# Patient Record
Sex: MALE | Race: WHITE | NOT HISPANIC OR LATINO
[De-identification: names, ages, dates, MRNs, and addresses within clinical notes are randomized per-mention and may not be internally consistent; named-entity substitution may affect disease eponyms.]

---

## 2022-02-08 PROBLEM — Z00.00 ENCOUNTER FOR PREVENTIVE HEALTH EXAMINATION: Status: ACTIVE | Noted: 2022-02-08

## 2022-02-11 ENCOUNTER — NON-APPOINTMENT (OUTPATIENT)
Age: 77
End: 2022-02-11

## 2022-02-11 ENCOUNTER — APPOINTMENT (OUTPATIENT)
Dept: CARDIOLOGY | Facility: CLINIC | Age: 77
End: 2022-02-11
Payer: MEDICARE

## 2022-02-11 VITALS
DIASTOLIC BLOOD PRESSURE: 85 MMHG | SYSTOLIC BLOOD PRESSURE: 170 MMHG | RESPIRATION RATE: 16 BRPM | HEART RATE: 67 BPM | OXYGEN SATURATION: 98 % | BODY MASS INDEX: 25.16 KG/M2 | WEIGHT: 166 LBS | HEIGHT: 68 IN

## 2022-02-11 DIAGNOSIS — F17.210 NICOTINE DEPENDENCE, CIGARETTES, UNCOMPLICATED: ICD-10-CM

## 2022-02-11 DIAGNOSIS — Z78.9 OTHER SPECIFIED HEALTH STATUS: ICD-10-CM

## 2022-02-11 DIAGNOSIS — Z72.3 LACK OF PHYSICAL EXERCISE: ICD-10-CM

## 2022-02-11 DIAGNOSIS — F17.200 NICOTINE DEPENDENCE, UNSPECIFIED, UNCOMPLICATED: ICD-10-CM

## 2022-02-11 PROCEDURE — 93000 ELECTROCARDIOGRAM COMPLETE: CPT

## 2022-02-11 PROCEDURE — 99204 OFFICE O/P NEW MOD 45 MIN: CPT

## 2022-02-11 RX ORDER — ASPIRIN 81 MG
81 TABLET, DELAYED RELEASE (ENTERIC COATED) ORAL DAILY
Refills: 0 | Status: ACTIVE | COMMUNITY

## 2022-02-11 RX ORDER — PERINDOPRIL ERBUMINE 8 MG/1
8 TABLET ORAL DAILY
Qty: 90 | Refills: 0 | Status: ACTIVE | COMMUNITY
Start: 1900-01-01 | End: 1900-01-01

## 2022-02-11 NOTE — HISTORY OF PRESENT ILLNESS
[FreeTextEntry1] : Father may have had CAD? but unsure.\par \par Currently a light cigar smoker since the age of 17. Drinks wine with every meal, does not exercise and wife states he snacks on junk foods. \par \par Mr. Zimmerman is without any coronary symptoms. Denies any exertional discomforts or l/e edema.  \par \par Seems that his BPs have been running high home, 170s/80s but their machine is old. \par \par Has not had any recent available blood work.\par There is been no recent cardiac testing.

## 2022-02-11 NOTE — PHYSICAL EXAM
[de-identified] :                    Well appearing and nourished with no obvious deformities or distress.\par \par Eyes: \par No conjunctival injection and no xanthelasmas.\par HEENT: \par Normocephalic.Normal oral mucosa. No pallor or cyanosis\par Neck: \par No jugular venous distension. with normal A and V wave forms. No palpable adenopathy.\par Cardiovascular: \par Normal rate and rhythm with normal S1, S2 and a grade 1/6 systolic murmur. Distal arterial pulses are normal. No significant peripheral edema.\par Pulmonary: \par Lungs are clear to auscultation and percussion. Normal respiratory pattern without any accessory muscle use\par Abdomen: \par Soft, non-tender ; no palpable organomegaly or masses.\par Extremities:\par No digital clubbing, cyanosis or ischemic changes.\par Skin: \par No skin lesions, rashes, ulcers or xanthomas.\par Psychiatric: \par Alert and oriented to person, place and time. Appropriate mood and affect.

## 2022-02-11 NOTE — REASON FOR VISIT
[FreeTextEntry1] : 76 year old male presenting for initial eval. with our practice but under the care of cardiology in [place]. He is in need of refills of his cardiac medications Perindopril(Aceon) -Amlodipine 5-10 mg QD and Orvatez 10mg/20mg which is Zetia/Atorvastatin combo\par \par He continues to travel frequently to [place].\par \par Hx includes:\par 1. High grade CAD with stenting\par 2. Right carotid disease\par 3. HLD\par 4. HTN\par 5.Reportedly borderline DM on Metformin. \par \par Coronary hx:\par 3/14/2000-[place]/ [place]\par Presented to the ER with chest discomfort. \par \par Distal RCA stents x 3\par Prox LAD stented x 1\par \par 11/12/2012-[place] ( records in [place])\par Presented to the ER with left shoulder discomfort\par \par Distal LAD stented x 1 \par RCA x stent 1\par PLVB x stent 1 \par 2013/[facility]\par \par \par 2013  Right carotid endarterectomy with Dr. Kit Real\par \par Reportedly had a  coronary stent re-look in 2019? during a visit to [place] which was patent?\par No records available

## 2022-02-11 NOTE — ASSESSMENT
[FreeTextEntry1] : ECG-Normal sinus rhythm at 67 bpm.\par Cannot exclude inferior wall infarct age undetermined\par No significant ST or T wave changes.\par \par \par No lab data\par \par Impression\par 76-year-old male with extensive past coronary artery history as detailed above.\par RCA stents x3 in 2000\par RCA and posterior LV branch and LAD stents 2012\par \par Risk factors of tobacco use, hypertension, hyperlipidemia and possible diabetes.\par \par Relative noncompliance with any form of diet exercise or follow-up.\par \par Absence of any acute cardiac symptoms at this point in time.\par \par History of carotid endarterectomy.\par \par Possible inferior wall MI on ECG.\par \par Plan:\par 1.  Had a detailed conversation with patient and his wife about the need for diligent follow-up with cardiology and the impact that risk factor modification and control as well as lifestyle changes could have on his prognosis.\par \par 2.  Full set of blood work ordered and to be obtained shortly\par \par 3.  Carotid duplex ordered to reassess endarterectomy\par \par 4.  Echocardiogram to assess for possible ischemic heart disease especially old inferior wall\par \par 5.  Exercise stress test should be obtained to risk stratify\par \par 6.  Blood pressure elevation needs to be validated.\par We will increase perindopril component of his medication to 8 mg\par Home blood pressure cuff will be assessed for accuracy\par \par Follow-up after the testing.\par Plan\par 1. Will RX Perindopril 8 mg to better control his BPs. Amlodipine will be continued at 10mg. He understands that meds witll be repeated as combo is not available in the states\par \par 2. Continue current Zetia/ Atorvastatin combo until we get new BW\par \par 3.

## 2022-02-14 LAB
ALBUMIN SERPL ELPH-MCNC: 4.6 G/DL
ALP BLD-CCNC: 93 U/L
ALT SERPL-CCNC: 34 U/L
ANION GAP SERPL CALC-SCNC: 16 MMOL/L
AST SERPL-CCNC: 22 U/L
BASOPHILS # BLD AUTO: 0.07 K/UL
BASOPHILS NFR BLD AUTO: 0.8 %
BILIRUB SERPL-MCNC: 0.5 MG/DL
BUN SERPL-MCNC: 19 MG/DL
CALCIUM SERPL-MCNC: 10 MG/DL
CHLORIDE SERPL-SCNC: 102 MMOL/L
CHOLEST SERPL-MCNC: 240 MG/DL
CO2 SERPL-SCNC: 23 MMOL/L
CREAT SERPL-MCNC: 0.91 MG/DL
EOSINOPHIL # BLD AUTO: 0.28 K/UL
EOSINOPHIL NFR BLD AUTO: 3.2 %
ESTIMATED AVERAGE GLUCOSE: 278 MG/DL
GLUCOSE SERPL-MCNC: 319 MG/DL
HBA1C MFR BLD HPLC: 11.3 %
HCT VFR BLD CALC: 50.3 %
HDLC SERPL-MCNC: 56 MG/DL
HGB BLD-MCNC: 16.1 G/DL
IMM GRANULOCYTES NFR BLD AUTO: 0.2 %
LDLC SERPL CALC-MCNC: 132 MG/DL
LYMPHOCYTES # BLD AUTO: 2.82 K/UL
LYMPHOCYTES NFR BLD AUTO: 32.3 %
MAN DIFF?: NORMAL
MCHC RBC-ENTMCNC: 28.9 PG
MCHC RBC-ENTMCNC: 32 GM/DL
MCV RBC AUTO: 90.1 FL
MONOCYTES # BLD AUTO: 0.63 K/UL
MONOCYTES NFR BLD AUTO: 7.2 %
NEUTROPHILS # BLD AUTO: 4.9 K/UL
NEUTROPHILS NFR BLD AUTO: 56.3 %
NONHDLC SERPL-MCNC: 184 MG/DL
PLATELET # BLD AUTO: 194 K/UL
POTASSIUM SERPL-SCNC: 4.7 MMOL/L
PROT SERPL-MCNC: 6.8 G/DL
PSA SERPL-MCNC: 0.74 NG/ML
RBC # BLD: 5.58 M/UL
RBC # FLD: 13.1 %
SODIUM SERPL-SCNC: 141 MMOL/L
TRIGL SERPL-MCNC: 262 MG/DL
TSH SERPL-ACNC: 2.3 UIU/ML
WBC # FLD AUTO: 8.72 K/UL

## 2022-02-18 ENCOUNTER — APPOINTMENT (OUTPATIENT)
Dept: CARDIOLOGY | Facility: CLINIC | Age: 77
End: 2022-02-18
Payer: MEDICARE

## 2022-02-18 PROCEDURE — 93306 TTE W/DOPPLER COMPLETE: CPT

## 2022-02-18 PROCEDURE — 93880 EXTRACRANIAL BILAT STUDY: CPT

## 2022-02-21 ENCOUNTER — APPOINTMENT (OUTPATIENT)
Dept: CARDIOLOGY | Facility: CLINIC | Age: 77
End: 2022-02-21
Payer: MEDICARE

## 2022-02-21 PROCEDURE — 93015 CV STRESS TEST SUPVJ I&R: CPT

## 2022-02-22 RX ORDER — ATORVASTATIN CALCIUM 20 MG/1
20 TABLET, FILM COATED ORAL DAILY
Qty: 60 | Refills: 0 | Status: DISCONTINUED | COMMUNITY
End: 2022-02-22

## 2022-03-10 ENCOUNTER — APPOINTMENT (OUTPATIENT)
Dept: CARDIOLOGY | Facility: CLINIC | Age: 77
End: 2022-03-10
Payer: MEDICARE

## 2022-03-10 VITALS
BODY MASS INDEX: 24.86 KG/M2 | HEART RATE: 58 BPM | DIASTOLIC BLOOD PRESSURE: 84 MMHG | HEIGHT: 68 IN | RESPIRATION RATE: 16 BRPM | SYSTOLIC BLOOD PRESSURE: 142 MMHG | WEIGHT: 164 LBS

## 2022-03-10 PROCEDURE — 93000 ELECTROCARDIOGRAM COMPLETE: CPT

## 2022-03-10 PROCEDURE — 99214 OFFICE O/P EST MOD 30 MIN: CPT

## 2022-03-10 RX ORDER — GLIPIZIDE 5 MG/1
5 TABLET, FILM COATED, EXTENDED RELEASE ORAL DAILY
Refills: 0 | Status: ACTIVE | COMMUNITY

## 2022-03-10 RX ORDER — SITAGLIPTIN AND METFORMIN HYDROCHLORIDE 50; 1000 MG/1; MG/1
50-1000 TABLET, FILM COATED ORAL TWICE DAILY
Refills: 0 | Status: ACTIVE | COMMUNITY

## 2022-03-10 NOTE — ASSESSMENT
[FreeTextEntry1] : ECG-Normal sinus rhythm at 58  bpm.\par Cannot exclude inferior wall infarct age undetermined\par No significant ST or T wave changes.\par \par Lab data\par ----2/14/2022:\par Chol--- 240\par HDL----56\par LDL--- 132\par Trig--- 262\par A1c--- 11.3\par Hgb--- 16.1\par \par Carotid duplex 2/18/2022:\par Left: Moderate plaque CCA and ICA: Peak 92 cm/s: 1 to 49%\par Right: Mild CCA: Peak 87 cm/s: Largely patent post CEA.\par \par Echocardiogram 2/18/2022:\par Discrete area of hypokinesis involving the basal inferoseptal and inferior wall.\par Overall normal left ventricular systolic function\par Focal aortic valve sclerosis with mild restriction.\par \par Stress test 2/21/2022:\par Time: 6 minutes (7 METS)\par Heart rate: 136 (94%)\par Blood pressure: 214/88 mmHg hypertensive\par ECG: Equivocal 1 mm ST segment depressions inferiorly\par \par Impression\par 1.  76-year-old male with extensive past coronary artery history as detailed above.\par RCA stents x 3 in 2000\par RCA and posterior LV branch and LAD stents 2012\par Reduced overall exercise tolerance with no angina and no definitive evidence of ischemia on stress testing.\par \par 2.  Hypertension control improved, over, still elevated.\par \par 3.  Hyperlipidemia with improved diet and change to rosuvastatin made.  Repeat labs with pending in about 2 months\par \par 4.  Status post right carotid endarterectomy, which appears relatively patent.\par       Moderate atherosclerosis of left internal carotid artery without any high-grade stenosis.\par \par 5. Relative noncompliance with any form of diet exercise or follow-up.\par \par 6.  Primary care providing augmented management of diabetes with improvement in diet.\par \par 7.  Echocardiogram demonstrating definite inferior wall infarct though preservation of LV function is noted.\par \par Plan:\par 1.  Had a detailed conversation with patient and his wife about the need for diligent follow-up with cardiology and the impact that risk factor modification and control as well as lifestyle changes could have on his prognosis.\par \par 2.  Continuation of current diabetes meds and rosuvastatin with repeat blood work pending\par \par 3.  Add hydrochlorothiazide 25 mg a day repeat blood work in a few weeks\par \par 4.  Repeat carotid duplex in 1 year\par \par 5.  Consider nuclear stress test given the equivocal ST segment abnormalities either in 1 year or sooner if there are new symptoms\par \par 6.  Clinical follow-up here in 3 months\par

## 2022-03-10 NOTE — HISTORY OF PRESENT ILLNESS
[FreeTextEntry1] : Father may have had CAD? but unsure.\par \par Currently a light cigar smoker since the age of 17. Drinks wine with every meal, does not exercise and wife states he snacks on junk foods. \par \par Mr. Zimmerman is without any coronary symptoms. Denies any exertional discomforts or l/e edema.  \par \par Seems that his BPs have been running high home, 170s/80s but their machine is old.  Now in the 140s with medication change\par \par

## 2022-03-10 NOTE — REASON FOR VISIT
[FreeTextEntry1] : 76 year old male presenting for cardiac re- eval. with fragmented care as he typically receives his cardiology follow-up in [place].\par Presenting here for management as he is in need of refills of his cardiac medications Perindopril(Aceon) -Amlodipine 5-10 mg QD and Orvatez 10mg/20mg which is Zetia/Atorvastatin combo\par \par Prior visit, we had increased Aceon (perindopril) to 8 mg daily and given amlodipine as a separate 10 mg dose.\par Based on abnormal blood work, we had switched him over to rosuvastatin 40 mg daily and advised urgent evaluation with regard to managing uncontrolled diabetes mellitus.\par Glipizide 5 ER and Janumet 50/1000 twice daily are now being taken with improvement in his serum sugars.\par \par Testing including: \par Carotid Doppler\par An echocardiogram\par Stress test\par Will be reviewed today\par \par He continues to travel frequently to [place].\par \par Hx includes:\par 1. High grade CAD with stenting\par 2. Right carotid disease status post CEA\par 3. HLD\par 4. HTN\par 5.Reportedly borderline DM on Metformin. \par \par Coronary hx:\par 3/14/2000-[place]/ [place]\par Presented to the ER with chest discomfort. \par \par Distal RCA stents x 3\par Prox LAD stented x 1\par \par 11/12/2012-[place] ( records in [place]par Presented to the ER with left shoulder discomfort\par \par Distal LAD stented x 1 \par RCA x stent 1\par PLVB x stent 1 \par \par 2013/[facility]\par 2013  Right carotid endarterectomy with Dr. Kit Real\par \par Reportedly had a  coronary stent re-look in 2019? during a visit to [place] which was patent?\par No records available

## 2022-03-10 NOTE — PHYSICAL EXAM
[de-identified] :                    Well appearing and nourished with no obvious deformities or distress.\par \par Eyes: \par No conjunctival injection and no xanthelasmas.\par HEENT: \par Normocephalic.Normal oral mucosa. No pallor or cyanosis\par Neck: \par No jugular venous distension. with normal A and V wave forms. No palpable adenopathy.\par Cardiovascular: \par Normal rate and rhythm with normal S1, S2 and a grade 1/6 systolic murmur. Distal arterial pulses are normal. No significant peripheral edema.\par Pulmonary: \par Lungs are clear to auscultation and percussion. Normal respiratory pattern without any accessory muscle use\par Abdomen: \par Soft, non-tender ; no palpable organomegaly or masses.\par Extremities:\par No digital clubbing, cyanosis or ischemic changes.\par Skin: \par No skin lesions, rashes, ulcers or xanthomas.\par Psychiatric: \par Alert and oriented to person, place and time. Appropriate mood and affect.

## 2022-05-09 ENCOUNTER — NON-APPOINTMENT (OUTPATIENT)
Age: 77
End: 2022-05-09

## 2022-05-24 ENCOUNTER — RX RENEWAL (OUTPATIENT)
Age: 77
End: 2022-05-24

## 2022-05-26 ENCOUNTER — APPOINTMENT (OUTPATIENT)
Dept: CARDIOLOGY | Facility: CLINIC | Age: 77
End: 2022-05-26

## 2022-06-13 ENCOUNTER — APPOINTMENT (OUTPATIENT)
Age: 77
End: 2022-06-13

## 2022-09-01 ENCOUNTER — NON-APPOINTMENT (OUTPATIENT)
Age: 77
End: 2022-09-01

## 2022-09-16 ENCOUNTER — APPOINTMENT (OUTPATIENT)
Dept: CARDIOLOGY | Facility: CLINIC | Age: 77
End: 2022-09-16

## 2022-09-16 PROCEDURE — 93306 TTE W/DOPPLER COMPLETE: CPT

## 2022-09-22 ENCOUNTER — APPOINTMENT (OUTPATIENT)
Dept: CARDIOLOGY | Facility: CLINIC | Age: 77
End: 2022-09-22

## 2022-09-22 VITALS
SYSTOLIC BLOOD PRESSURE: 126 MMHG | DIASTOLIC BLOOD PRESSURE: 72 MMHG | BODY MASS INDEX: 23.95 KG/M2 | RESPIRATION RATE: 14 BRPM | HEIGHT: 68 IN | HEART RATE: 63 BPM | WEIGHT: 158 LBS

## 2022-09-22 PROCEDURE — 99214 OFFICE O/P EST MOD 30 MIN: CPT

## 2022-09-22 PROCEDURE — 93000 ELECTROCARDIOGRAM COMPLETE: CPT

## 2022-09-22 RX ORDER — HYDROCHLOROTHIAZIDE 25 MG/1
25 TABLET ORAL DAILY
Qty: 90 | Refills: 0 | Status: DISCONTINUED | COMMUNITY
Start: 2022-03-10 | End: 2022-09-22

## 2022-09-22 RX ORDER — ATORVASTATIN CALCIUM 40 MG/1
40 TABLET, FILM COATED ORAL
Refills: 0 | Status: ACTIVE | COMMUNITY

## 2022-09-22 RX ORDER — ROSUVASTATIN CALCIUM 40 MG/1
40 TABLET, FILM COATED ORAL DAILY
Qty: 90 | Refills: 2 | Status: DISCONTINUED | COMMUNITY
Start: 2022-02-22 | End: 2022-09-22

## 2022-09-22 RX ORDER — EZETIMIBE 10 MG/1
10 TABLET ORAL DAILY
Qty: 90 | Refills: 2 | Status: DISCONTINUED | COMMUNITY
End: 2022-09-22

## 2022-09-22 NOTE — PHYSICAL EXAM
[de-identified] :                    Well appearing and nourished with no obvious deformities or distress.\par \par Eyes: \par No conjunctival injection and no xanthelasmas.\par HEENT: \par Normocephalic.Normal oral mucosa. No pallor or cyanosis\par Neck: \par No jugular venous distension. with normal A and V wave forms. No palpable adenopathy.\par Cardiovascular: \par Normal rate and rhythm with normal S1, S2 and a grade 1/6 systolic murmur. Distal arterial pulses are normal. No significant peripheral edema.\par Pulmonary: \par Lungs are clear to auscultation and percussion. Normal respiratory pattern without any accessory muscle use\par Abdomen: \par Soft, non-tender ; no palpable organomegaly or masses.\par Extremities:\par No digital clubbing, cyanosis or ischemic changes.\par Skin: \par No skin lesions, rashes, ulcers or xanthomas.\par Psychiatric: \par Alert and oriented to person, place and time. Appropriate mood and affect.

## 2022-09-22 NOTE — ASSESSMENT
[FreeTextEntry1] : ECG-sinus rhythm at 63.  Possible old inferior wall MI.  No significant ST-T wave changes.\par \par Lab data\par ----2/14/2022:\par Chol--- 240\par HDL----56\par LDL--- 132\par Trig--- 262\par A1c--- 11.3\par Hgb--- 16.1\par \par Carotid duplex 2/18/2022:\par Left: Moderate plaque CCA and ICA: Peak 92 cm/s: 1 to 49%\par Right: Mild CCA: Peak 87 cm/s: Largely patent post CEA.\par \par Echocardiogram 9/16/2022:\par Inferobasilar wall hypokinesis with overall normal left ventricular systolic function LVEF 55 to 60%\par Mild dilatation of the ascending aorta 3.7 cm\par Small circumferential pericardial effusion.\par \par Echocardiogram 2/18/2022:\par Discrete area of hypokinesis involving the basal inferoseptal and inferior wall.\par Overall normal left ventricular systolic function\par Focal aortic valve sclerosis with mild restriction.\par \par Stress test 2/21/2022:\par Time: 6 minutes (7 METS)\par Heart rate: 136 (94%)\par Blood pressure: 214/88 mmHg hypertensive\par ECG: Equivocal 1 mm ST segment depressions inferiorly\par \par Impression\par 1.  76-year-old male with extensive past coronary artery history as detailed above.\par RCA stents x 3 in 2000\par RCA and posterior LV branch and LAD stents 2012\par Reduced overall exercise tolerance with no angina and no definitive evidence of ischemia on stress testing.\par \par 2.  Hypertension controlled off of hydrochlorothiazide.\par \par 3.  Hyperlipidemia intolerant of rosuvastatin back on atorvastatin.  No labs available.\par \par 4.  Status post right carotid endarterectomy, which appears relatively patent.\par       Moderate atherosclerosis of left internal carotid artery without any high-grade stenosis.\par \par 5.  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  Primary care providing augmented management of diabetes with improvement in diet.\par \par 7.  Echocardiogram demonstrating definite inferior wall infarct though preservation of LV function is noted.\par \par Plan:\par 1.  Repeat echocardiogram in about 3 months to relook at the pericardial effusion.\par      Reassured at this point it is small and without hemodynamic effect and no indication for any drainage.\par \par 2.  Continuation of current diabetes meds and atorvastatin with repeat blood work pending\par \par 3.  Repeat carotid duplex in 1 year\par \par   Clinical follow-up here in 3 months\par

## 2022-09-22 NOTE — REASON FOR VISIT
[FreeTextEntry1] : 76 year old male presenting for cardiac re- eval. 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 noted at the time of diagnosis.\par Started on carboplatin etoposide with a reasonable clinical response.\par CT scan chest and abdomen reportedly showed a pericardial effusion prompting echocardiography and evaluation here today.\par \par Patient states that he has far less back and hip pain than he did previously and denies any significant shortness of breath.\par \par He has continued on his antihypertensive regimen though hydrochlorothiazide was dropped presumptively because of blood pressure downtrending.\par \par Prior visit, we had increased Aceon (perindopril) to 8 mg daily and given amlodipine as a separate 10 mg dose.\par Based on abnormal blood work, we had switched him over to rosuvastatin 40 mg daily but this led to myalgias in the legs and he was switched back to atorvastatin 40 mg daily.\par Glipizide 5 ER and Janumet 50/1000 twice daily are now being taken with improvement in his serum sugars.\par \par Hx includes:\par 1. High grade CAD with stenting\par 2. Right carotid disease status post CEA\par 3. HLD\par 4. HTN\par 5.Reportedly borderline DM on Metformin. \par \par Coronary hx:\par 3/14/2000-[place]/ [place]\par Presented to the ER with chest discomfort. \par \par Distal RCA stents x 3\par Prox LAD stented x 1\par \par 11/12/2012-[place] ( records in [place])\par Presented to the ER with left shoulder discomfort\par \par Distal LAD stented x 1 \par RCA x stent 1\par PLVB x stent 1 \par \par 2013/[facility]\par 2013  Right carotid endarterectomy with Dr. Kit Real\par \par Reportedly had a  coronary stent re-look in 2019? during a visit to [place] which was patent?\par No records available

## 2022-11-29 ENCOUNTER — LABORATORY RESULT (OUTPATIENT)
Age: 77
End: 2022-11-29

## 2022-12-01 ENCOUNTER — APPOINTMENT (OUTPATIENT)
Dept: CARDIOLOGY | Facility: CLINIC | Age: 77
End: 2022-12-01

## 2022-12-01 PROCEDURE — 93306 TTE W/DOPPLER COMPLETE: CPT

## 2022-12-06 ENCOUNTER — APPOINTMENT (OUTPATIENT)
Dept: CARDIOLOGY | Facility: CLINIC | Age: 77
End: 2022-12-06

## 2022-12-06 VITALS
BODY MASS INDEX: 24.4 KG/M2 | SYSTOLIC BLOOD PRESSURE: 122 MMHG | HEART RATE: 65 BPM | HEIGHT: 68 IN | OXYGEN SATURATION: 97 % | WEIGHT: 161 LBS | DIASTOLIC BLOOD PRESSURE: 60 MMHG | RESPIRATION RATE: 16 BRPM

## 2022-12-06 DIAGNOSIS — E11.9 TYPE 2 DIABETES MELLITUS W/OUT COMPLICATIONS: ICD-10-CM

## 2022-12-06 PROCEDURE — 93000 ELECTROCARDIOGRAM COMPLETE: CPT

## 2022-12-06 PROCEDURE — 99214 OFFICE O/P EST MOD 30 MIN: CPT

## 2022-12-06 NOTE — ASSESSMENT
[FreeTextEntry1] : ECG-sinus rhythm at 65.  Possible old inferior wall MI.  No significant ST-T wave changes.\par \par Lab data\par ----2/14/2022:--11/29/22\par Chol--- 240-------175\par HDL----56---------47\par LDL--- 132--------75\par Trig--- 262--------264\par A1c--- 11.3\par Hgb--- 16.1\par \par Carotid duplex 2/18/2022:\par Left: Moderate plaque CCA and ICA: Peak 92 cm/s: 1 to 49%\par Right: Mild CCA: Peak 87 cm/s: Largely patent post CEA.\par \par Echocardiogram 12/1/2022:\par Basal inferolateral hypokinesis with  low normal function LVEF 50 to 55%\par Mildly dry left atrial\par Small circumferential pericardial effusion unchanged from prior study.\par \par Echocardiogram 9/16/2022:\par Inferobasilar wall hypokinesis with overall normal left ventricular systolic function LVEF 55 to 60%\par Mild dilatation of the ascending aorta 3.7 cm\par Small circumferential pericardial effusion.\par \par Echocardiogram 2/18/2022:\par Discrete area of hypokinesis involving the basal inferoseptal and inferior wall.\par Overall normal left ventricular systolic function\par Focal aortic valve sclerosis with mild restriction.\par \par Stress test 2/21/2022:\par Time: 6 minutes (7 METS)\par Heart rate: 136 (94%)\par Blood pressure: 214/88 mmHg hypertensive\par ECG: Equivocal 1 mm ST segment depressions inferiorly\par \par Impression\par 1.  76-year-old male with extensive past coronary artery history as detailed above.\par RCA stents x 3 in 2000\par RCA and posterior LV branch and LAD stents 2012\par Reduced overall exercise tolerance with no angina and no definitive evidence of ischemia on stress testing.\par \par 2.  Hypertension controlled off of hydrochlorothiazide.\par \par 3.  Hyperlipidemia intolerant of rosuvastatin back on atorvastatin and markedly improved (see above)\par \par 4.  Status post right carotid endarterectomy, which appears relatively patent.\par       Moderate atherosclerosis of left internal carotid artery without any high-grade stenosis.\par \par 5.  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  Primary care providing augmented management of diabetes with improvement in diet.\par \par 7.  Echocardiogram demonstrating definite inferior wall infarct though preservation of LV function is noted.\par \par Plan:\par 1.  Repeat echocardiogram in about 4 months to relook at the pericardial effusion.\par      Reassured at this point it is small and without hemodynamic effect and no indication for any drainage.\par \par 2.  Continuation of current diabetes meds and atorvastatin with repeat blood work in 3 to 4 months\par \par 3.  Repeat carotid duplex in 1 year\par \par   Clinical follow-up here 4 months\par

## 2022-12-06 NOTE — PHYSICAL EXAM
[de-identified] :                    Well appearing and nourished with no obvious deformities or distress.\par \par Eyes: \par No conjunctival injection and no xanthelasmas.\par HEENT: \par Normocephalic.Normal oral mucosa. No pallor or cyanosis\par Neck: \par No jugular venous distension. with normal A and V wave forms. No palpable adenopathy.\par Cardiovascular: \par Normal rate occasional extrasystoles with normal S1, S2 and a grade 1/6 systolic murmur. Distal arterial pulses are normal. No significant peripheral edema.\par Pulmonary: \par Lungs are clear to auscultation and percussion, slightly decreased breath sounds at the right base. Normal respiratory pattern without any accessory muscle use\par Abdomen: \par Soft, non-tender ; no palpable organomegaly or masses.\par Extremities:\par No digital clubbing, cyanosis or ischemic changes.\par Skin: \par No skin lesions, rashes, ulcers or xanthomas.\par Psychiatric: \par Alert and oriented to person, place and time. Appropriate mood and affect.

## 2022-12-06 NOTE — REASON FOR VISIT
Home
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 noted at the time of diagnosis.\par Started on carboplatin etoposide with a reasonable clinical response.\par CT scan chest and abdomen reportedly showed a pericardial effusion\par Echocardiography 9/16/2022 showed a small circumferential pericardial effusion which was followed up with with a repeat echocardiogram 12/1/2022.\par \par Patient states that he has far less back and hip pain than he did previously and denies any significant shortness of breath.\par \par He has continued on his antihypertensive regimen though hydrochlorothiazide was dropped presumptively because of blood pressure downtrending.\par \par Previously, increased Aceon (perindopril) to 8 mg daily and given amlodipine as a separate 10 mg dose.\par Based on abnormal blood work, we had switched him over to rosuvastatin 40 mg daily but this led to myalgias in the legs and he was switched back to atorvastatin 40 mg daily.\par Glipizide 5 ER and Janumet 50/1000 twice daily are now being taken with improvement in his serum sugars.\par \par Hx includes:\par 1. High grade CAD with stenting of proximal LAD and distal RCA\par 2. Right carotid disease status post CEA\par 3. HLD\par 4. HTN\par 5.Reportedly borderline DM on Metformin. \par \par Coronary hx:\par 3/14/2000-[place]/ [place]\par Presented to the ER with chest discomfort. \par \par Distal RCA stents x 3\par Prox LAD stented x 1\par \par 11/12/2012-[place] ( records in [place])\par Presented to the ER with left shoulder discomfort\par \par Distal LAD stented x 1 \par RCA x stent 1\par PLVB x stent 1 \par \par 2013/[facility]\par 2013  Right carotid endarterectomy with Dr. Kit Real\par \par Reportedly had a  coronary stent re-look in 2019? during a visit to [place] which was patent?\par No records available

## 2022-12-06 NOTE — HISTORY OF PRESENT ILLNESS
[FreeTextEntry1] : Father may have had CAD? but unsure.\par \par Currently a light cigar smoker since the age of 17. Drinks wine with every meal, does not exercise and wife states he snacks on junk foods. \par \par Mr. Zimmerman is without any coronary symptoms. Denies any exertional discomforts or l/e edema.  \par \par \par \par

## 2023-04-26 ENCOUNTER — APPOINTMENT (OUTPATIENT)
Dept: CARDIOLOGY | Facility: CLINIC | Age: 78
End: 2023-04-26

## 2023-05-02 ENCOUNTER — APPOINTMENT (OUTPATIENT)
Dept: CARDIOLOGY | Facility: CLINIC | Age: 78
End: 2023-05-02
Payer: MEDICARE

## 2023-05-02 PROCEDURE — 93306 TTE W/DOPPLER COMPLETE: CPT

## 2023-05-17 ENCOUNTER — APPOINTMENT (OUTPATIENT)
Dept: CARDIOLOGY | Facility: CLINIC | Age: 78
End: 2023-05-17
Payer: MEDICARE

## 2023-05-17 ENCOUNTER — NON-APPOINTMENT (OUTPATIENT)
Age: 78
End: 2023-05-17

## 2023-05-17 VITALS
SYSTOLIC BLOOD PRESSURE: 140 MMHG | RESPIRATION RATE: 15 BRPM | WEIGHT: 165.8 LBS | BODY MASS INDEX: 25.13 KG/M2 | DIASTOLIC BLOOD PRESSURE: 68 MMHG | OXYGEN SATURATION: 95 % | HEART RATE: 86 BPM | HEIGHT: 68 IN

## 2023-05-17 DIAGNOSIS — E78.5 HYPERLIPIDEMIA, UNSPECIFIED: ICD-10-CM

## 2023-05-17 DIAGNOSIS — I25.10 ATHEROSCLEROTIC HEART DISEASE OF NATIVE CORONARY ARTERY W/OUT ANGINA PECTORIS: ICD-10-CM

## 2023-05-17 DIAGNOSIS — I31.39 OTHER PERICARDIAL EFFUSION (NONINFLAMMATORY): ICD-10-CM

## 2023-05-17 DIAGNOSIS — Z95.5 PRESENCE OF CORONARY ANGIOPLASTY IMPLANT AND GRAFT: ICD-10-CM

## 2023-05-17 DIAGNOSIS — I77.9 DISORDER OF ARTERIES AND ARTERIOLES, UNSPECIFIED: ICD-10-CM

## 2023-05-17 DIAGNOSIS — I10 ESSENTIAL (PRIMARY) HYPERTENSION: ICD-10-CM

## 2023-05-17 PROCEDURE — 99214 OFFICE O/P EST MOD 30 MIN: CPT

## 2023-05-17 PROCEDURE — 93000 ELECTROCARDIOGRAM COMPLETE: CPT

## 2023-05-17 RX ORDER — AMLODIPINE BESYLATE 10 MG/1
10 TABLET ORAL
Qty: 90 | Refills: 1 | Status: DISCONTINUED | COMMUNITY
End: 2023-05-17

## 2023-05-17 RX ORDER — METFORMIN HYDROCHLORIDE 1000 MG/1
1000 TABLET, COATED ORAL DAILY
Refills: 0 | Status: ACTIVE | COMMUNITY

## 2023-05-19 PROBLEM — E78.5 HLD (HYPERLIPIDEMIA): Status: ACTIVE | Noted: 2022-02-11

## 2023-05-19 PROBLEM — I10 HTN (HYPERTENSION): Status: ACTIVE | Noted: 2022-02-11

## 2023-05-19 PROBLEM — I25.10 CORONARY ARTERY DISEASE: Status: ACTIVE | Noted: 2022-02-11

## 2023-05-19 PROBLEM — I31.39 PERICARDIAL EFFUSION: Status: ACTIVE | Noted: 2022-12-06

## 2023-05-19 PROBLEM — Z95.5 HISTORY OF HEART ARTERY STENT: Status: ACTIVE | Noted: 2022-02-11

## 2023-05-19 PROBLEM — I77.9 CAROTID ARTERY DISEASE: Status: ACTIVE | Noted: 2022-02-11

## 2023-05-29 NOTE — HISTORY OF PRESENT ILLNESS
[FreeTextEntry1] : Patient reports feeling well in general. He continues to remain active around the house, doing yard work. Pt denies any chest pain, dizziness, syncope, near-syncope, SOB, orthopnea or PND.\par \par Does not check his blood pressure regularly at home but reports "normal" readings at other doctors appointments. \par \par \par \par

## 2023-05-29 NOTE — PHYSICAL EXAM
[de-identified] :                    Well appearing and nourished with no obvious deformities or distress.\par \par Eyes: \par No conjunctival injection and no xanthelasmas.\par HEENT: \par Normocephalic.Normal oral mucosa. No pallor or cyanosis\par Neck: \par No jugular venous distension. with normal A and V wave forms. No palpable adenopathy.\par Cardiovascular: \par Normal rate occasional extrasystoles with normal S1, S2 and a grade 1/6 systolic murmur. Distal arterial pulses are normal. No significant peripheral edema.\par Pulmonary: \par Lungs are clear to auscultation and percussion, slightly decreased breath sounds at the right base. Normal respiratory pattern without any accessory muscle use\par Abdomen: \par Soft, non-tender ; no palpable organomegaly or masses.\par Extremities:\par No digital clubbing, cyanosis or ischemic changes.\par Skin: \par No skin lesions, rashes, ulcers or xanthomas.\par Psychiatric: \par Alert and oriented to person, place and time. Appropriate mood and affect.

## 2023-05-29 NOTE — REVIEW OF SYSTEMS
[Weight Gain (___ Lbs)] : [unfilled] ~Ulb weight gain [SOB] : no shortness of breath [Dyspnea on exertion] : not dyspnea during exertion [Chest Discomfort] : no chest discomfort [Cough] : no cough [FreeTextEntry2] : Other than as documented here and in the HPI, the thirteen point ROS is negative

## 2023-05-29 NOTE — ASSESSMENT
[FreeTextEntry1] : ECG-sinus rhythm at 86 bom. Occ PVC. Possible old inferior wall infarct.  No significant ST-T wave changes.\par \par Lab data\par ----2/14/2022:--11/29/22\par Chol--- 240-------175\par HDL----56---------47\par LDL--- 132--------75\par Trig--- 262--------264\par A1c--- 11.3\par Hgb--- 16.1\par \par Carotid duplex 2/18/2022:\par Left: Moderate plaque CCA and ICA: Peak 92 cm/s: 1 to 49%\par Right: Mild CCA: Peak 87 cm/s: Largely patent post CEA.\par \par Echocardiogram 5/2/2023:\par Basal inferior inferolateral hypokinesis with ejection fraction of 55%\par Mild left atrial enlargement\par Low normal RV function\par Small pericardial effusion with right atrial inversion.\par Focal aortic valve sclerosis.\par \par Echocardiogram 12/1/2022:\par Basal inferolateral hypokinesis with  low normal function LVEF 50 to 55%\par Mildly dry left atrial\par Small circumferential pericardial effusion unchanged from prior study.\par \par Echocardiogram 9/16/2022:\par Inferobasilar wall hypokinesis with overall normal left ventricular systolic function LVEF 55 to 60%\par Mild dilatation of the ascending aorta 3.7 cm\par Small circumferential pericardial effusion.\par \par Echocardiogram 2/18/2022:\par Discrete area of hypokinesis involving the basal inferoseptal and inferior wall.\par Overall normal left ventricular systolic function\par Focal aortic valve sclerosis with mild restriction.\par \par Stress test 2/21/2022:\par Time: 6 minutes (7 METS)\par Heart rate: 136 (94%)\par Blood pressure: 214/88 mmHg hypertensive\par ECG: Equivocal 1 mm ST segment depressions inferiorly\par \par Impression\par 1.  76-year-old male with extensive past coronary artery history as detailed above.\par RCA stents x 3 in 2000\par RCA and posterior LV branch and LAD stents 2012\par Reduced overall exercise tolerance with no angina and no definitive evidence of ischemia on stress testing.\par \par 2.  Hypertension possibly inadequate control with amlodipine 10 mg. \par \par 3.  Hyperlipidemia intolerant of rosuvastatin back on atorvastatin and markedly improved in the past. no recent lipid panel for review. \par \par 4.  Status post right carotid endarterectomy, which appears relatively patent.  On 2022 study\par       Moderate atherosclerosis of left internal carotid artery without any high-grade stenosis.\par \par 5.  Hx of pericardial effusion. Echocardiogram done earlier this morning shows small pericardial effusion without tamponade, LVEF 55% ischemic CM, normal RV. No signs of HF on exam. No SOB. \par      \par \par Plan:\par 1.  Discontinue Amlodipine and replace with Nifedipine 90 mg for better BP control. Monitor home blood pressures and notify us of persistent elevations. Bring BP log to his next OV. \par \par 2.  Continuation of current diabetes meds and atorvastatin. Repeat lipid panel and CMP. Follow a low-fat, low-carbohydrate diet to lower cholesterol. \par \par 3.  Repeat carotid duplex. \par \par 4. Restart Aspirin 81 mg daily. Patient stopped on his own, does not know for how long. States it is possible he stopped it prior to a biopsy and never restarted. \par \par 5.  Need to get some updated information regarding management of his lung cancer.\par   Clinical follow-up here 4 months\par

## 2023-05-29 NOTE — REASON FOR VISIT
[FreeTextEntry1] : 77  year old male presenting for cardiac re- evaluation and to discuss the results of his recent echocardiogram. 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 High grade CAD with stenting of proximal LAD and distal RCA\par 2. Right carotid disease status post CEA\par 3. HLD\par 4. HTN\par 5.Reportedly borderline DM on Metformin. \par \par Coronary hx:\par 3/14/2000-[place]/ [place]\par Presented to the ER with chest discomfort. \par \par Distal RCA stents x 3\par Prox LAD stented x 1\par \par 11/12/2012-[place] ( records in [place])\par Presented to the ER with left shoulder discomfort\par \par Distal LAD stented x 1 \par RCA x stent 1\par PLVB x stent 1 \par \par 2013/[facility]\par 2013  Right carotid endarterectomy with Dr. Kit Real\par \par Reportedly had a  coronary stent re-look in 2019 during a visit to [place] which was patent. \par

## 2023-07-25 ENCOUNTER — NON-APPOINTMENT (OUTPATIENT)
Age: 78
End: 2023-07-25

## 2023-09-19 ENCOUNTER — NON-APPOINTMENT (OUTPATIENT)
Age: 78
End: 2023-09-19

## 2023-09-19 RX ORDER — NIFEDIPINE 90 MG/1
90 TABLET, EXTENDED RELEASE ORAL DAILY
Qty: 90 | Refills: 1 | Status: DISCONTINUED | COMMUNITY
Start: 2023-05-17 | End: 2023-09-19

## 2023-09-19 RX ORDER — AMLODIPINE BESYLATE 10 MG/1
10 TABLET ORAL
Refills: 0 | Status: ACTIVE | COMMUNITY
Start: 1900-01-01 | End: 1900-01-01

## 2023-09-19 RX ORDER — METOPROLOL SUCCINATE 200 MG/1
200 TABLET, EXTENDED RELEASE ORAL DAILY
Qty: 90 | Refills: 3 | Status: ACTIVE | COMMUNITY

## 2023-09-26 ENCOUNTER — APPOINTMENT (OUTPATIENT)
Dept: CARDIOLOGY | Facility: CLINIC | Age: 78
End: 2023-09-26

## 2023-09-26 ENCOUNTER — OFFICE (OUTPATIENT)
Dept: URBAN - METROPOLITAN AREA CLINIC 63 | Facility: CLINIC | Age: 78
Setting detail: OPHTHALMOLOGY
End: 2023-09-26
Payer: MEDICARE

## 2023-09-26 DIAGNOSIS — H25.13: ICD-10-CM

## 2023-09-26 DIAGNOSIS — E11.9: ICD-10-CM

## 2023-09-26 DIAGNOSIS — H40.033: ICD-10-CM

## 2023-09-26 DIAGNOSIS — H04.123: ICD-10-CM

## 2023-09-26 PROCEDURE — 92250 FUNDUS PHOTOGRAPHY W/I&R: CPT | Performed by: OPHTHALMOLOGY

## 2023-09-26 PROCEDURE — 92002 INTRM OPH EXAM NEW PATIENT: CPT | Performed by: OPHTHALMOLOGY

## 2023-09-26 PROCEDURE — 92020 GONIOSCOPY: CPT | Performed by: OPHTHALMOLOGY

## 2023-09-26 ASSESSMENT — REFRACTION_MANIFEST
OU_VA: 20/20
OS_ADD: +2.25
OD_SPHERE: +2.00
OS_CYLINDER: -1.00
OD_ADD: +2.25
OS_VA1: 20/20
OD_CYLINDER: -0.50
OS_AXIS: 092
OD_VA1: 20/25
OS_SPHERE: +2.25
OD_AXIS: 074

## 2023-09-26 ASSESSMENT — KERATOMETRY
OS_AXISANGLE_DEGREES: 016
OD_AXISANGLE_DEGREES: 139
OD_K1POWER_DIOPTERS: 42.75
OS_K2POWER_DIOPTERS: 44.00
OS_K1POWER_DIOPTERS: 43.25
OD_K2POWER_DIOPTERS: 43.75

## 2023-09-26 ASSESSMENT — SPHEQUIV_DERIVED
OS_SPHEQUIV: 1.75
OD_SPHEQUIV: 2.5
OD_SPHEQUIV: 1.75
OS_SPHEQUIV: 2.875

## 2023-09-26 ASSESSMENT — REFRACTION_AUTOREFRACTION
OS_AXIS: 105
OD_AXIS: 075
OD_SPHERE: +3.25
OS_SPHERE: +3.50
OD_CYLINDER: -1.50
OS_CYLINDER: -1.25

## 2023-09-26 ASSESSMENT — TONOMETRY
OS_IOP_MMHG: 19
OD_IOP_MMHG: 19

## 2023-09-26 ASSESSMENT — AXIALLENGTH_DERIVED
OD_AL: 22.7415
OD_AL: 23.0163
OS_AL: 22.886
OS_AL: 22.4808

## 2023-09-26 ASSESSMENT — CONFRONTATIONAL VISUAL FIELD TEST (CVF)
OS_FINDINGS: FULL
OD_FINDINGS: FULL

## 2023-09-26 ASSESSMENT — VISUAL ACUITY
OD_BCVA: 20/20-1
OS_BCVA: 20/25

## 2023-09-26 ASSESSMENT — SUPERFICIAL PUNCTATE KERATITIS (SPK)
OS_SPK: T
OD_SPK: T

## 2023-11-10 ENCOUNTER — NON-APPOINTMENT (OUTPATIENT)
Age: 78
End: 2023-11-10

## 2024-03-19 ENCOUNTER — RX RENEWAL (OUTPATIENT)
Age: 79
End: 2024-03-19